# Patient Record
Sex: FEMALE | Race: WHITE | Employment: FULL TIME | URBAN - METROPOLITAN AREA
[De-identification: names, ages, dates, MRNs, and addresses within clinical notes are randomized per-mention and may not be internally consistent; named-entity substitution may affect disease eponyms.]

---

## 2018-05-08 ENCOUNTER — OFFICE VISIT (OUTPATIENT)
Dept: OBGYN CLINIC | Age: 33
End: 2018-05-08

## 2018-05-08 VITALS
SYSTOLIC BLOOD PRESSURE: 104 MMHG | DIASTOLIC BLOOD PRESSURE: 62 MMHG | WEIGHT: 134 LBS | HEIGHT: 67 IN | BODY MASS INDEX: 21.03 KG/M2

## 2018-05-08 DIAGNOSIS — N63.20 LEFT BREAST LUMP: ICD-10-CM

## 2018-05-08 DIAGNOSIS — Z01.419 ENCOUNTER FOR GYNECOLOGICAL EXAMINATION WITHOUT ABNORMAL FINDING: ICD-10-CM

## 2018-05-08 DIAGNOSIS — N64.4 BREAST PAIN: Primary | ICD-10-CM

## 2018-05-08 RX ORDER — DROSPIRENONE AND ETHINYL ESTRADIOL 0.02-3(28)
1 KIT ORAL DAILY
Qty: 3 PACKAGE | Refills: 4 | Status: SHIPPED | OUTPATIENT
Start: 2018-05-08

## 2018-05-08 NOTE — PROGRESS NOTES
Annual exam ages 21-44    Harvey Guerrero is a No obstetric history on file. ,  27 y.o. female [place]  No LMP recorded. .    She presents for her annual checkup. She is having significant left breast pain and thinks she may have felt a mass. With regard to the Gardasil vaccine, she is older than the FDA approved age to receive it. Menstrual status:    Her periods are light, moderate in flow. She is using three to five pads or tampons per day, usually regular and last 26-30 days. She reports dysmenorrhea and heaviness of cycle for several of the days during her menses    She reports no premenstrual symptoms. Contraception:    The current method of family planning is vasectomy. Sexual history:     She  reports that she currently engages in sexual activity and has had male partners. .    Medical conditions:    Since her last annual GYN exam about one year ago, she has not the following changes in her health history: none. Pap and Mammogram History:    Her most recent Pap smear was normal, obtained 2 year(s) ago. The patient has never had a mammogram.    Breast Cancer History/Substance Abuse: negative    Past Medical History:   Diagnosis Date    Hyperthyroidism     Pap smear for cervical cancer screening 2/10/15 jose d HPV NEG     History reviewed. No pertinent surgical history. Allergies: Review of patient's allergies indicates no known allergies. Tobacco History:  reports that she has never smoked. She has never used smokeless tobacco.  Alcohol Abuse:  reports that she does not drink alcohol. Drug Abuse:  reports that she does not use illicit drugs.     Family Medical/Cancer History:   Family History   Problem Relation Age of Onset    No Known Problems Mother         Review of Systems - History obtained from the patient  Constitutional: negative for weight loss, fever, night sweats  HEENT: negative for hearing loss, earache, congestion, snoring, sorethroat  CV: negative for chest pain, palpitations, edema  Resp: negative for cough, shortness of breath, wheezing  GI: negative for change in bowel habits, abdominal pain, black or bloody stools  : negative for frequency, dysuria, hematuria, vaginal discharge  MSK: negative for back pain, joint pain, muscle pain  Breast: negative for breast lumps, nipple discharge, galactorrhea  Skin :negative for itching, rash, hives  Neuro: negative for dizziness, headache, confusion, weakness  Psych: negative for anxiety, depression, change in mood  Heme/lymph: negative for bleeding, bruising, pallor    Physical Exam    Visit Vitals    /62    Ht 5' 7\" (1.702 m)    Wt 134 lb (60.8 kg)    BMI 20.99 kg/m2       Constitutional  · Appearance: well-nourished, well developed, alert, in no acute distress    HENT  · Head and Face: appears normal    Neck  · Inspection/Palpation: normal appearance, no masses or tenderness  · Lymph Nodes: no lymphadenopathy present  · Thyroid: gland size normal, nontender, no nodules or masses present on palpation    Chest  · Respiratory Effort: breathing unlabored    Breasts  · Inspection of Breasts: breasts symmetrical, no skin changes, no discharge present, nipple appearance normal, no skin retraction present  · Palpation of Breasts and Axillae: no masses present on palpation, + left breast tenderness in upper outer quadrant  · Axillary Lymph Nodes: no lymphadenopathy present    Gastrointestinal  · Abdominal Examination: abdomen non-tender to palpation, normal bowel sounds, no masses present  · Liver and spleen: no hepatomegaly present, spleen not palpable  · Hernias: no hernias identified    Genitourinary  · External Genitalia: normal appearance for age, no discharge present, no tenderness present, no inflammatory lesions present, no masses present, no atrophy present  · Vagina: normal vaginal vault without central or paravaginal defects, no discharge present, no inflammatory lesions present, no masses present  · Bladder: non-tender to palpation  · Urethra: appears normal  · Cervix: normal   · Uterus: normal size, shape and consistency  · Adnexa: no adnexal tenderness present, no adnexal masses present  · Perineum: perineum within normal limits, no evidence of trauma, no rashes or skin lesions present  · Anus: anus within normal limits, no hemorrhoids present  · Inguinal Lymph Nodes: no lymphadenopathy present    Skin  · General Inspection: no rash, no lesions identified    Neurologic/Psychiatric  · Mental Status:  · Orientation: grossly oriented to person, place and time  · Mood and Affect: mood normal, affect appropriate    . Assessment:  Routine gynecologic examination  Her current medical status is satisfactory with no evidence of significant gynecologic issues. Left breast tenderness and fullness- will schedule ultrasound/mammogram/and breast surgeon appt.   +dysmenorrhea and heavy cycles will restart ocps    Plan:  Counseled re: diet, exercise, healthy lifestyle  Return for yearly wellness visits  Pt counseled regarding co-testing for high risk HPV with pap  Rec screening mammo at either 33 or 38

## 2018-05-11 ENCOUNTER — HOSPITAL ENCOUNTER (OUTPATIENT)
Dept: MAMMOGRAPHY | Age: 33
Discharge: HOME | End: 2018-05-11
Attending: OBSTETRICS & GYNECOLOGY
Payer: COMMERCIAL

## 2018-05-11 DIAGNOSIS — N64.4 BREAST PAIN: ICD-10-CM

## 2018-05-11 DIAGNOSIS — N63.20 LEFT BREAST LUMP: ICD-10-CM

## 2018-05-11 PROCEDURE — 77066 DX MAMMO INCL CAD BI: CPT

## 2018-05-11 PROCEDURE — 76642 ULTRASOUND BREAST LIMITED: CPT

## 2021-08-10 ENCOUNTER — OFFICE VISIT (OUTPATIENT)
Dept: OBGYN CLINIC | Age: 36
End: 2021-08-10
Payer: COMMERCIAL

## 2021-08-10 VITALS — WEIGHT: 127.8 LBS | BODY MASS INDEX: 20.02 KG/M2 | DIASTOLIC BLOOD PRESSURE: 72 MMHG | SYSTOLIC BLOOD PRESSURE: 110 MMHG

## 2021-08-10 DIAGNOSIS — Z01.419 ROUTINE GYNECOLOGICAL EXAMINATION: Primary | ICD-10-CM

## 2021-08-10 PROCEDURE — 99395 PREV VISIT EST AGE 18-39: CPT | Performed by: OBSTETRICS & GYNECOLOGY

## 2021-08-10 NOTE — PROGRESS NOTES
Annual exam ages 21-44    Khadra Wynn is a No obstetric history on file. ,  38 y.o. female   Patient's last menstrual period was 07/21/2021. She presents for her annual checkup. She is having heavy cycles and wats to talk about an ablation. .    With regard to the Gardasil vaccine, she is older than the FDA approved age to receive it. Menstrual status:    Her periods are heavy in flow. She is using three to ten pads or tampons per day, usually regular with a 26-32 day interval with 3-7 day duration. She does not have dysmenorrhea. She reports no premenstrual symptoms. Contraception:    The current method of family planning is vasectomy. Sexual history:    She  reports being sexually active and has had partner(s) who are Male. Medical conditions:    Since her last annual GYN exam about 6 years ago, she has not the following changes in her health history: none. Surgical history confirmed with patient. has no past surgical history on file. Pap and Mammogram History:    Her most recent Pap smear was normal, obtained 6 year(s) ago. The patient has never had a mammogram.    Breast Cancer History/Substance Abuse: negative    Past Medical History:   Diagnosis Date    Breast lump 2018    x 2 months left breast 2 oclock    Hyperthyroidism     Pap smear for cervical cancer screening 2/10/15 jose d HPV NEG     History reviewed. No pertinent surgical history. Current Outpatient Medications   Medication Sig Dispense Refill    drospirenone-ethinyl estradiol (KHADIJAH) 3-0.02 mg tab Take 1 Tab by mouth daily. Take pill continuously and discard placebo pills. (Patient not taking: Reported on 8/10/2021) 3 Package 4     Allergies: Patient has no known allergies. Tobacco History:  reports that she has never smoked. She has never used smokeless tobacco.  Alcohol Abuse:  reports no history of alcohol use. Drug Abuse:  reports no history of drug use.     Family Medical/Cancer History:   Family History   Problem Relation Age of Onset    No Known Problems Mother     Breast Cancer Maternal Grandmother         Review of Systems - History obtained from the patient  Constitutional: negative for weight loss, fever, night sweats  HEENT: negative for hearing loss, earache, congestion, snoring, sorethroat  CV: negative for chest pain, palpitations, edema  Resp: negative for cough, shortness of breath, wheezing  GI: negative for change in bowel habits, abdominal pain, black or bloody stools  : negative for frequency, dysuria, hematuria, vaginal discharge  MSK: negative for back pain, joint pain, muscle pain  Breast: negative for breast lumps, nipple discharge, galactorrhea  Skin :negative for itching, rash, hives  Neuro: negative for dizziness, headache, confusion, weakness  Psych: negative for anxiety, depression, change in mood  Heme/lymph: negative for bleeding, bruising, pallor    Physical Exam    Visit Vitals  /72   Wt 127 lb 12.8 oz (58 kg)   LMP 07/21/2021   BMI 20.02 kg/m²       Constitutional  · Appearance: well-nourished, well developed, alert, in no acute distress    HENT  · Head and Face: appears normal    Neck  · Inspection/Palpation: normal appearance, no masses or tenderness  · Lymph Nodes: no lymphadenopathy present  · Thyroid: gland size normal, nontender, no nodules or masses present on palpation    Chest  · Respiratory Effort: breathing unlabored    Breasts  · Inspection of Breasts: breasts symmetrical, no skin changes, no discharge present, nipple appearance normal, no skin retraction present  · Palpation of Breasts and Axillae: no masses present on palpation, no breast tenderness  · Axillary Lymph Nodes: no lymphadenopathy present    Gastrointestinal  · Abdominal Examination: abdomen non-tender to palpation, normal bowel sounds, no masses present  · Liver and spleen: no hepatomegaly present, spleen not palpable  · Hernias: no hernias identified    Genitourinary  · External Genitalia: normal appearance for age, no discharge present, no tenderness present, no inflammatory lesions present, no masses present, no atrophy present  · Vagina: normal vaginal vault without central or paravaginal defects, no discharge present, no inflammatory lesions present, no masses present  · Bladder: non-tender to palpation  · Urethra: appears normal  · Cervix: normal   · Uterus: normal size, shape and consistency  · Adnexa: no adnexal tenderness present, no adnexal masses present  · Perineum: perineum within normal limits, no evidence of trauma, no rashes or skin lesions present  · Anus: anus within normal limits, no hemorrhoids present  · Inguinal Lymph Nodes: no lymphadenopathy present    Skin  · General Inspection: no rash, no lesions identified    Neurologic/Psychiatric  · Mental Status:  · Orientation: grossly oriented to person, place and time  · Mood and Affect: mood normal, affect appropriate    Assessment:  Routine gynecologic examination  Her current medical status is satisfactory with no evidence of significant gynecologic issues. Dysmenorrha and subjective menorrhagia- discussed medical options vs ablation. (specifically also discussed continuous Lo loestrin). She will continue her options and notify.     Plan:  Counseled re: diet, exercise, healthy lifestyle  Return for yearly wellness visits  Pt counseled regarding co-testing for high risk HPV with pap  Rec screening mammo at either 33 or 38

## 2021-08-12 ENCOUNTER — TELEPHONE (OUTPATIENT)
Dept: OBGYN CLINIC | Age: 36
End: 2021-08-12

## 2021-08-12 RX ORDER — NORETHINDRONE ACETATE AND ETHINYL ESTRADIOL, ETHINYL ESTRADIOL AND FERROUS FUMARATE 1MG-10(24)
1 KIT ORAL DAILY
Qty: 3 PACKAGE | Refills: 4 | Status: SHIPPED | OUTPATIENT
Start: 2021-08-12 | End: 2021-11-10

## 2021-08-12 NOTE — TELEPHONE ENCOUNTER
Call received at 17;20PM  35 year old patient last seen in the office on 8/10/2021 for annual exam    Patient calling to say that she would like a new prescription for     Lo Loestrin to be sent to her [facility] pharmacy at [address] [place] [address]    Patient would like to take the pills continuously skipping her placebo pills    Please advise      Thank you

## 2021-08-12 NOTE — TELEPHONE ENCOUNTER
Patient advised of MD recommendations and prescription sent as per MD order to patient confirmed pharmacy    Patient verbalized understanding.

## 2021-08-13 ENCOUNTER — TELEPHONE (OUTPATIENT)
Dept: OBGYN CLINIC | Age: 36
End: 2021-08-13

## 2021-08-13 LAB
CYTOLOGIST CVX/VAG CYTO: NORMAL
CYTOLOGY CVX/VAG DOC CYTO: NORMAL
CYTOLOGY CVX/VAG DOC THIN PREP: NORMAL
CYTOLOGY HISTORY:: NORMAL
DX ICD CODE: NORMAL
HPV I/H RISK 4 DNA CVX QL PROBE+SIG AMP: NEGATIVE
Lab: NORMAL
OTHER STN SPEC: NORMAL
STAT OF ADQ CVX/VAG CYTO-IMP: NORMAL

## 2021-08-13 NOTE — TELEPHONE ENCOUNTER
Call received @ [phone]    Pt calling to request a change to her OCP       norethindrone-e.estradioL-iron (Lo Loestrin Fe) 1 mg-10 mcg (24)/10 mcg (2) tab    Pt states her insurance will not cover it and it is still pricey with manufactures coupon.      Please review

## 2021-08-16 NOTE — TELEPHONE ENCOUNTER
Junel fe 1 po daily #3 packs with refills. This is a generic and so it should be cheap, however if it is not then she can either ask her pharmacy or her insurance which pills are covered/least expensive.

## 2021-08-17 NOTE — TELEPHONE ENCOUNTER
This nurse attempted a second time to reach the patient and left a detailed message for the patient to call the office back    My chart message sent as well.

## 2021-08-18 RX ORDER — NORETHINDRONE ACETATE AND ETHINYL ESTRADIOL 1MG-20(21)
1 KIT ORAL DAILY
Qty: 3 PACKAGE | Refills: 4 | Status: SHIPPED | OUTPATIENT
Start: 2021-08-18

## 2021-08-18 NOTE — TELEPHONE ENCOUNTER
Call received at 9:50am      Patient calling back and was advised of MD recommendations and prescription sent as per MD order to patient confirmed pharmacy    Patient verbalized understanding.

## 2021-09-13 ENCOUNTER — TELEPHONE (OUTPATIENT)
Dept: OBGYN CLINIC | Age: 36
End: 2021-09-13

## 2021-09-13 NOTE — TELEPHONE ENCOUNTER
Pt calling regarding her recent pap. She states she received a bill for her PAP smear, which is unusual for her. She wanted to confirm that all we billed for was the PAP. This RN confirmed with the pt that the procedure that was done was the same as the bill. Pt verbalized understanding and is going to contact billing.

## 2021-09-29 ENCOUNTER — APPOINTMENT (OUTPATIENT)
Age: 36
Setting detail: DERMATOLOGY
End: 2021-09-29

## 2021-09-29 DIAGNOSIS — D22 MELANOCYTIC NEVI: ICD-10-CM

## 2021-09-29 DIAGNOSIS — L81.4 OTHER MELANIN HYPERPIGMENTATION: ICD-10-CM

## 2021-09-29 DIAGNOSIS — Z71.89 OTHER SPECIFIED COUNSELING: ICD-10-CM

## 2021-09-29 PROBLEM — D22.62 MELANOCYTIC NEVI OF LEFT UPPER LIMB, INCLUDING SHOULDER: Status: ACTIVE | Noted: 2021-09-29

## 2021-09-29 PROBLEM — D23.71 OTHER BENIGN NEOPLASM OF SKIN OF RIGHT LOWER LIMB, INCLUDING HIP: Status: ACTIVE | Noted: 2021-09-29

## 2021-09-29 PROBLEM — D22.5 MELANOCYTIC NEVI OF TRUNK: Status: ACTIVE | Noted: 2021-09-29

## 2021-09-29 PROCEDURE — 99203 OFFICE O/P NEW LOW 30 MIN: CPT

## 2021-09-29 PROCEDURE — OTHER MIPS QUALITY: OTHER

## 2021-09-29 PROCEDURE — OTHER COUNSELING: OTHER

## 2021-09-29 PROCEDURE — OTHER REASSURANCE: OTHER

## 2021-09-29 PROCEDURE — OTHER OBSERVATION: OTHER

## 2021-09-29 PROCEDURE — OTHER SUNSCREEN RECOMMENDATIONS: OTHER

## 2021-09-29 ASSESSMENT — LOCATION DETAILED DESCRIPTION DERM
LOCATION DETAILED: LEFT MEDIAL SUPERIOR CHEST
LOCATION DETAILED: SUPERIOR THORACIC SPINE
LOCATION DETAILED: LEFT INFERIOR CENTRAL MALAR CHEEK
LOCATION DETAILED: LEFT AXILLARY VAULT

## 2021-09-29 ASSESSMENT — LOCATION SIMPLE DESCRIPTION DERM
LOCATION SIMPLE: UPPER BACK
LOCATION SIMPLE: CHEST
LOCATION SIMPLE: LEFT AXILLARY VAULT
LOCATION SIMPLE: LEFT CHEEK

## 2021-09-29 ASSESSMENT — LOCATION ZONE DERM
LOCATION ZONE: AXILLAE
LOCATION ZONE: FACE
LOCATION ZONE: TRUNK

## 2021-09-29 NOTE — HPI: SKIN LESION
What Type Of Note Output Would You Prefer (Optional)?: Standard Output
How Severe Is Your Skin Lesion?: mild
Has Your Skin Lesion Been Treated?: not been treated
Is This A New Presentation, Or A Follow-Up?: Skin Lesions
Additional History: Patient presents today for evaluation of a few moles. Patient reports she has them checked yearly and last year was seen in [place]. Patient states none of the moles have changed.

## 2021-09-29 NOTE — PROCEDURE: SUNSCREEN RECOMMENDATIONS
Products Recommended: Colorscience, EltaMD UV clear, Blue lizard, any broad spectrum zinc base sunscreen-reapply often, Revision Intellashade
Detail Level: Generalized
General Sunscreen Counseling: I recommended a broad spectrum sunscreen with a SPF of 30 or higher.  I explained that SPF 30 sunscreens block approximately 97 percent of the sun's harmful rays.  Sunscreens should be applied at least 15 minutes prior to expected sun exposure and then every 2 hours after that as long as sun exposure continues. If swimming or exercising sunscreen should be reapplied every 45 minutes to an hour after getting wet or sweating.  One ounce, or the equivalent of a shot glass full of sunscreen, is adequate to protect the skin not covered by a bathing suit. I also recommended a lip balm with a sunscreen as well. Sun protective clothing can be used in lieu of sunscreen but must be worn the entire time you are exposed to the sun's rays.

## 2021-09-29 NOTE — PROCEDURE: MIPS QUALITY
Quality 130: Documentation Of Current Medications In The Medical Record: Current Medications Documented
Quality 226: Preventive Care And Screening: Tobacco Use: Screening And Cessation Intervention: Patient screened for tobacco use and is an ex/non-smoker
Quality 431: Preventive Care And Screening: Unhealthy Alcohol Use - Screening: Patient not identified as an unhealthy alcohol user when screened for unhealthy alcohol use using a systematic screening method
Quality 110: Preventive Care And Screening: Influenza Immunization: Influenza Immunization Administered during Influenza season
Detail Level: Detailed

## 2023-05-10 RX ORDER — NORETHINDRONE ACETATE AND ETHINYL ESTRADIOL 1MG-20(21)
1 KIT ORAL DAILY
COMMUNITY
Start: 2018-05-08

## 2023-11-08 ENCOUNTER — TRANSCRIBE ORDERS (OUTPATIENT)
Facility: HOSPITAL | Age: 38
End: 2023-11-08

## 2023-11-08 DIAGNOSIS — Z12.31 VISIT FOR SCREENING MAMMOGRAM: Primary | ICD-10-CM

## 2024-10-28 ENCOUNTER — OFFICE VISIT (OUTPATIENT)
Age: 39
End: 2024-10-28
Payer: COMMERCIAL

## 2024-10-28 VITALS
BODY MASS INDEX: 20.25 KG/M2 | DIASTOLIC BLOOD PRESSURE: 66 MMHG | SYSTOLIC BLOOD PRESSURE: 102 MMHG | HEIGHT: 66 IN | HEART RATE: 80 BPM | WEIGHT: 126 LBS

## 2024-10-28 DIAGNOSIS — N92.6 MISSED MENSES: ICD-10-CM

## 2024-10-28 DIAGNOSIS — N76.0 ACUTE VAGINITIS: Primary | ICD-10-CM

## 2024-10-28 PROCEDURE — 99203 OFFICE O/P NEW LOW 30 MIN: CPT | Performed by: OBSTETRICS & GYNECOLOGY

## 2024-10-28 PROCEDURE — 99459 PELVIC EXAMINATION: CPT | Performed by: OBSTETRICS & GYNECOLOGY

## 2024-10-28 RX ORDER — MEDROXYPROGESTERONE ACETATE 10 MG
10 TABLET ORAL DAILY
Qty: 30 TABLET | Refills: 3 | Status: SHIPPED | OUTPATIENT
Start: 2024-10-28

## 2024-10-28 RX ORDER — TERCONAZOLE 0.4 %
1 CREAM WITH APPLICATOR VAGINAL NIGHTLY
Qty: 1 EACH | Refills: 0 | Status: SHIPPED | OUTPATIENT
Start: 2024-10-28 | End: 2024-11-04

## 2024-10-28 NOTE — PROGRESS NOTES
GYN Problem Visit Note    Chief Complaint   Vaginitis   Patient's last menstrual period was 08/20/2024 (exact date)..      HPI  Doretha Peres is a 39 y.o. female who complains of   Chief Complaint   Patient presents with    Vaginitis     She reports vulvar irritation a few weeks ago that resolved with monistat, she recently started noticing discharge and itching again.  She also reports that she has not had a period since 8/20, negative upts.     Per Nursing Note:  Patient's last menstrual period was 08/20/2024 (exact date).     The patient is reporting having internal vaginal itching, redness, and discharge. This has been intermittent since Sept.  Unsure if it's a yeast infection. Has tried Monistat 3, with some relief but sx has returned this past Saturday.    Past Medical History:   Diagnosis Date    Breast lump 2018    x 2 months left breast 2 oclock    Hyperthyroidism     Pap smear for cervical cancer screening 2/10/15 bruno HPV NEG     Past Surgical History:   Procedure Laterality Date    NASAL SEPTUM SURGERY  07/2022     Social History     Occupational History    Not on file   Tobacco Use    Smoking status: Never    Smokeless tobacco: Never   Vaping Use    Vaping status: Never Used   Substance and Sexual Activity    Alcohol use: No    Drug use: No    Sexual activity: Yes     Partners: Male     Birth control/protection: Surgical     Comment: -vasectomy     Family History   Problem Relation Age of Onset    No Known Problems Mother     Breast Cancer Maternal Grandmother        No Known Allergies  Prior to Admission medications    Medication Sig Start Date End Date Taking? Authorizing Provider   norethindrone-ethinyl estradiol (JUNEL FE 1/20) 1-20 MG-MCG per tablet Take 1 tablet by mouth daily  Patient not taking: Reported on 10/28/2024 5/8/18   Automatic Reconciliation, Ar        Review of Systems: History obtained from the patient  Constitutional: negative for weight loss, fever, night sweats  Breast:

## 2024-10-28 NOTE — PROGRESS NOTES
Doretha Peres is a 39 y.o. female presents for a problem visit.    Chief Complaint   Patient presents with    Vaginitis     Patient's last menstrual period was 08/20/2024 (exact date).    The patient is reporting having internal vaginal itching, redness, and discharge. This has been intermittent since Sept.  Unsure if it's a yeast infection. Has tried Monistat 3, with some relief but sx has returned this past Saturday.      Examination chaperoned by Marleni Saenz MA.

## 2024-10-30 LAB
A VAGINAE DNA VAG QL NAA+PROBE: NORMAL SCORE
BVAB2 DNA VAG QL NAA+PROBE: NORMAL SCORE
C ALBICANS DNA VAG QL NAA+PROBE: NEGATIVE
C GLABRATA DNA VAG QL NAA+PROBE: NEGATIVE
MEGA1 DNA VAG QL NAA+PROBE: NORMAL SCORE

## 2024-12-16 ENCOUNTER — OFFICE VISIT (OUTPATIENT)
Age: 39
End: 2024-12-16
Payer: COMMERCIAL

## 2024-12-16 ENCOUNTER — TELEPHONE (OUTPATIENT)
Age: 39
End: 2024-12-16

## 2024-12-16 VITALS
WEIGHT: 129 LBS | HEART RATE: 85 BPM | BODY MASS INDEX: 20.82 KG/M2 | DIASTOLIC BLOOD PRESSURE: 62 MMHG | SYSTOLIC BLOOD PRESSURE: 96 MMHG

## 2024-12-16 DIAGNOSIS — N76.0 ACUTE VAGINITIS: ICD-10-CM

## 2024-12-16 DIAGNOSIS — N89.8 VAGINAL DISCHARGE: Primary | ICD-10-CM

## 2024-12-16 PROCEDURE — 99213 OFFICE O/P EST LOW 20 MIN: CPT | Performed by: OBSTETRICS & GYNECOLOGY

## 2024-12-16 RX ORDER — METRONIDAZOLE 7.5 MG/G
1 GEL VAGINAL DAILY
Qty: 1 EACH | Refills: 1 | Status: SHIPPED | OUTPATIENT
Start: 2024-12-16 | End: 2024-12-21

## 2024-12-16 NOTE — TELEPHONE ENCOUNTER
Two patient identifiers used       39 year old patient was seen in the office today for problem visit and was prescribed Clindamycin Phosphate, 1 Dose, 2 % CREA [[id number]]    Order Details  Dose: 1 Applicatorful Route: Vaginal Frequency: ONCE   Dispense Quantity: 1 each         Patient is calling to say that her pharmacy is not able get the prescription requested above and needs to get a different prescription sent in to confirmed pharmacy.    Please advise    Thank you

## 2024-12-16 NOTE — PROGRESS NOTES
Doretha Peres is a 39 y.o. female presents for a problem visit.    Chief Complaint   Patient presents with    Vaginal Discharge     No LMP recorded.    The patient is reporting having: green/yellowish vaginal discharge.  Have been on going since October for yeast.     Examination chaperoned by Marleni Saenz MA.

## 2024-12-16 NOTE — PROGRESS NOTES
GYN Problem Visit Note    Chief Complaint   Vaginal Discharge   No LMP recorded..      HPI  Doretha Peres is a 39 y.o. female who complains of   Chief Complaint   Patient presents with    Vaginal Discharge       She reports greenish vaginal discharge off and on since October. It will become \"clumpy\" and can be associated with lower abdominal pain.     Per Nursing Note:  No LMP recorded.     The patient is reporting having: green vaginal discharge.  Have been on going since October for yeast.     Past Surgical History:   Procedure Laterality Date    NASAL SEPTUM SURGERY  07/2022     Social History     Occupational History    Not on file   Tobacco Use    Smoking status: Never    Smokeless tobacco: Never   Vaping Use    Vaping status: Never Used   Substance and Sexual Activity    Alcohol use: No    Drug use: No    Sexual activity: Yes     Partners: Male     Birth control/protection: Surgical     Comment: -vasectomy     Family History   Problem Relation Age of Onset    No Known Problems Mother     Breast Cancer Maternal Grandmother        No Known Allergies  Prior to Admission medications    Medication Sig Start Date End Date Taking? Authorizing Provider   medroxyPROGESTERone (PROVERA) 10 MG tablet Take 1 tablet by mouth daily 10/28/24   Misti Cali MD   norethindrone-ethinyl estradiol (JUNEL FE 1/20) 1-20 MG-MCG per tablet Take 1 tablet by mouth daily  Patient not taking: Reported on 10/28/2024 5/8/18   Automatic Reconciliation, Ar        Review of Systems: History obtained from the patient  Constitutional: negative for weight loss, fever, night sweats  Breast: negative for breast lumps, nipple discharge, galactorrhea  GI: negative for change in bowel habits, abdominal pain, black or bloody stools  : negative for frequency, dysuria, hematuria, vaginal discharge  MSK: negative for back pain, joint pain, muscle pain  Skin: negative for itching, rash, hives  Psych: negative for anxiety, depression, change

## 2024-12-16 NOTE — TELEPHONE ENCOUNTER
Misti Cali MD Physician Signed 3:44 PM     Copy     Metrogel sent to her pharmacy .          Patient advised of MD sent new prescription and instructions provided.    Patient verbalized understanding.

## 2024-12-16 NOTE — TELEPHONE ENCOUNTER
Two patient identifiers used      39 year old patient last seen in the office on 10/28/2024 for new patient problem. Patient reports using over the counter yeast treatment, Terazol prescribed at her appointment  on 10/18/2024 and went to urgent care and was given antibiotic. Patient reports she has symptoms of vaginal discharge that if not treated turns green in color and denies odor at this time. Patient denies burning with urination.    Patient was placed on the schedule to be seen today at 1:20 -pm.    Patient verbalized understanding

## 2024-12-18 ENCOUNTER — TELEPHONE (OUTPATIENT)
Age: 39
End: 2024-12-18

## 2024-12-18 NOTE — TELEPHONE ENCOUNTER
Misti Cali MD   to Me       12/18/24 11:31 AM  Note     Doxycycline is for chlamydia, cervicitis, or PID.  It is not the standard of care for potential bacterial vaginosis.  If she prefers not to take the metrogel then she can wait until the culture is back.  It should be back in the next few days.       Patient advised of MD recommendations and verbalized understanding.

## 2024-12-18 NOTE — TELEPHONE ENCOUNTER
Doxycycline is for chlamydia, cervicitis, or PID.  It is not the standard of care for potential bacterial vaginosis.  If she prefers not to take the metrogel then she can wait until the culture is back.  It should be back in the next few days.

## 2024-12-18 NOTE — TELEPHONE ENCOUNTER
Two patient identifier used      39 year old patient last seen in the office on 12/16/2024 for vaginal discharge.    Patient calling to say that she is uncomfortable taking metrogel due to the possible side effects and would like to take a medication that she has been on before.    Patient first choice is doxycyline.      Lab still pending    Pharmacy confirmed .    Please advise    Thank you

## 2024-12-20 LAB
A VAGINAE DNA VAG QL NAA+PROBE: ABNORMAL SCORE
BVAB2 DNA VAG QL NAA+PROBE: ABNORMAL SCORE
C ALBICANS DNA VAG QL NAA+PROBE: POSITIVE
C GLABRATA DNA VAG QL NAA+PROBE: NEGATIVE
C TRACH DNA SPEC QL NAA+PROBE: NEGATIVE
MEGA1 DNA VAG QL NAA+PROBE: ABNORMAL SCORE
N GONORRHOEA DNA VAG QL NAA+PROBE: NEGATIVE
T VAGINALIS DNA VAG QL NAA+PROBE: NEGATIVE

## 2024-12-23 RX ORDER — FLUCONAZOLE 150 MG/1
150 TABLET ORAL DAILY
Qty: 3 TABLET | Refills: 0 | Status: SHIPPED | OUTPATIENT
Start: 2024-12-23 | End: 2024-12-26

## 2025-01-24 ENCOUNTER — TELEPHONE (OUTPATIENT)
Age: 40
End: 2025-01-24

## 2025-01-24 RX ORDER — TERCONAZOLE 4 MG/G
CREAM VAGINAL
Qty: 45 G | Refills: 0 | Status: SHIPPED | OUTPATIENT
Start: 2025-01-24 | End: 2025-01-31

## 2025-01-24 NOTE — TELEPHONE ENCOUNTER
The yeast may be resistent to diflucan, can send terazol 7 cream.  Use PV nightly x 7 days #1 refills 0.

## 2025-01-24 NOTE — TELEPHONE ENCOUNTER
Misti Cali MD   to Me       1/24/25 10:39 AM  Note     The yeast may be resistent to diflucan, can send terazol 7 cream.  Use PV nightly x 7 days #1 refills 0.            Patient advised of Md recommendations and prescription sent as per MD verbal order .    Patient will monitor her symptoms and call back prn    Patient verbalized understanding.

## 2025-01-24 NOTE — TELEPHONE ENCOUNTER
Two patient identifiers used    39 year old patient last seen in the office on 8/10/2021 for ae and was scheduled today at 9:50 am on 2/18/2025    Patient was seen in October for possible yeast and yeast did not show up on her swab, patient came back on 12/1/6/2024 and had yeast on her swab.      Patient reports she has not been able to clear this yeast infection, patient has bought new underwear,changed soap,changed diet, and has tried otc treatments and done the diflucan and clindamycin and metrogel. Patient reports her symptoms get better but never away.    Patient is wondering does she need a long course of medication , different medication, two course of 7 day otc treatment        Patient pharmacy confirmed    ? Need ov      Please advise    Thank you